# Patient Record
Sex: MALE | Race: WHITE | NOT HISPANIC OR LATINO | Employment: OTHER | URBAN - NONMETROPOLITAN AREA
[De-identification: names, ages, dates, MRNs, and addresses within clinical notes are randomized per-mention and may not be internally consistent; named-entity substitution may affect disease eponyms.]

---

## 2024-10-04 ENCOUNTER — OFFICE VISIT (OUTPATIENT)
Dept: URGENT CARE | Facility: CLINIC | Age: 39
End: 2024-10-04
Payer: COMMERCIAL

## 2024-10-04 VITALS
TEMPERATURE: 99.3 F | DIASTOLIC BLOOD PRESSURE: 85 MMHG | SYSTOLIC BLOOD PRESSURE: 124 MMHG | HEART RATE: 99 BPM | WEIGHT: 180 LBS | OXYGEN SATURATION: 96 % | BODY MASS INDEX: 25.77 KG/M2 | RESPIRATION RATE: 16 BRPM | HEIGHT: 70 IN

## 2024-10-04 DIAGNOSIS — H92.03 OTALGIA OF BOTH EARS: ICD-10-CM

## 2024-10-04 DIAGNOSIS — J02.9 ACUTE PHARYNGITIS, UNSPECIFIED ETIOLOGY: Primary | ICD-10-CM

## 2024-10-04 PROCEDURE — 99213 OFFICE O/P EST LOW 20 MIN: CPT | Performed by: NURSE PRACTITIONER

## 2024-10-04 RX ORDER — AZITHROMYCIN 250 MG/1
TABLET, FILM COATED ORAL
Qty: 6 TABLET | Refills: 0 | Status: SHIPPED | OUTPATIENT
Start: 2024-10-04

## 2024-10-04 NOTE — PROGRESS NOTES
[facility] URGENT CARE  Nataliya DASIA Sargent, APRN-CNP     Visit Note - 10/4/2024 12:33 PM   This note was generated with voice recognition software and may contain errors including spelling, grammar, syntax, and misrecognization of what was dictated.    Patient: Adam Watson, MRN: [id number], 38 y.o., male   PCP: No primary care provider on file.  ------------------------------------  ALLERGIES: No Known Allergies     CURRENT MEDICATIONS:   Current Outpatient Medications   Medication Instructions    azithromycin (Zithromax Z-Korey) 250 mg tablet Take 2 tablets by mouth at once on day 1, then 1 tablet once a day on days 2-5. Take with a meal.     ------------------------------------  PAST MEDICAL HX:  No known health issues.    SURGICAL HX:  History reviewed. No pertinent surgical history.   FAMILY HX:   No pertinent history.   SOCIAL HX:    reports that he has never smoked. He has never used smokeless tobacco. Is a .   ------------------------------------  CHIEF COMPLAINT:   Chief Complaint   Patient presents with    URI     Sore throat, bi-lat ear pain x 5 days      HISTORY OF PRESENT ILLNESS: The history was obtained from patient. Adam is a 38 y.o. male, who presents with a chief complaint of a sore throat, bilat ear pain/pressure, nasal congestion, and a mild, dry cough - sxs started on Monday. Reports also had subjective fevers/chills and body aches at onset, but these have improved. Denies any headaches, abdominal pain, chest pain, wheezing/shortness of breath, rashes, urinary symptoms, nausea/vomiting, and diarrhea. Denies any lightheadedness or dizziness; no changes in mental status. No swelling in legs. Appetite is decreased ; is able to eat and drink fluids without difficulty; denies loss of sense of taste or smell. Reports symptoms have gotten worse since onset. Has been taking  Tylenol and OTC cold medications  without much relief; no other over-the-counter medications or home remedies  "for symptom management. No known ill contacts. Has received the COVID vaccine x 2; Has not received this season's influenza vaccine. No known history of COVID infection.  Is not a smoker.  No known history of asthma/COPD/respiratory issues. No recent antibiotic use.     REVIEW OF SYSTEMS:  10 systems reviewed negative with exception of history of present illness as listed above.    TODAY'S VITALS: /85   Pulse 99   Temp 37.4 °C (99.3 °F)   Resp 16   Ht 1.778 m (5' 10\")   Wt 81.6 kg (180 lb)   SpO2 96%   BMI 25.83 kg/m²     PHYSICAL EXAMINATION:  General:  Mildly ill-appearing, well nourished male; alert and oriented; in no acute distress. Sitting comfortably on exam table. Non-dyspneic.   Eyes:  Pupils equal, round and reactive to light. No conjunctival erythema; no scleral icterus.   HENT: No frontal or maxillary sinus tenderness; + audible nasal congestion. Airway patent, TMs both occluded by cerumen impactions; visible portions of ear canals unremarkable bilaterally. Nasal mucosa mildly injected and edematous. Oral mucosa moist. Posterior pharynx moderately injected. Uvula is midline. Managing oral secretions without difficulty.  Neck:  Supple. Tender, mobile anterior cervical lymphadenopathy bilat. Trachea is midline.  Respiratory:  Respirations easy and unlabored, Breath sounds equal. Lungs are clear to auscultation; no wheezes, rhonchi, or rales; has good air movement throughout. + mild, non-productive cough noted. Non-dyspneic with ambulation; able to maintain SpO2.  Cardiovascular:  Normal rate, Regular rhythm. Normal S1S2. No m/r/g. No peripheral edema.   Gastrointestinal:  Soft, non-tender, non-distended; no palpable masses or organomegaly. Bowel sounds normoactive.  Musculoskeletal:  Grossly normal; appropriate for age.     Integumentary:  Pink, warm, dry, and intact. No rashes or skin discoloration appreciated. Good skin turgor.  Neurologic:  Alert and oriented, no gross deficits.  "   Cognition and Speech:  Oriented, Speech clear and coherent.    Psychiatric:  Cooperative, Appropriate mood & affect.       ------------------------------------  Medical Decision Making  LABORATORY or RADIOLOGICAL IMAGING ORDERS/RESULTS:   None    IMPRESSION/PLAN:  Course: Worsening; stable    1. Acute pharyngitis, unspecified etiology  2. Otalgia of both ears  - azithromycin (Zithromax Z-Korey) 250 mg tablet; Take 2 tablets by mouth at once on day 1, then 1 tablet once a day on days 2-5. Take with a meal.  Dispense: 6 tablet; Refill: 0    No red flags on exam or per history. Unable to visualize TM's due to cerumen impactions. Based on history/exam, to err on the side of caution, due to severity and duration of symptoms, starting antibiotic treatment for presumptive strep pharyngitis/possible AOM of both ears today. Urged to complete full course of medication (Zithromax), even if symptoms resolve more quickly. Instructed to push fluids, rest, and to use appropriate over the counter medications as needed for management of symptoms - tylenol/ibuprofen, Cepacol, and salt water gargles may be helpful. Reviewed instructions for self-isolation and continued monitoring. Reviewed red flags to monitor for, counseled on potential adverse reactions of treatments, expectations for improvement in sxs, and advised to follow-up with primary care provider in 3-5 days if symptoms persist, or to seek care sooner if worsening or if any additional concerns/red flags develop.  Patient agreed with plan of care; questions were encouraged and answered.     I have reviewed the  COVID-19 algorithm, and discussed  COVID-19 current recommendations - patient states would like to defer COVID-19 testing today, but encouraged precautionary measures and close monitoring.        KYMBERLY Chin-CNP   Advanced Practice Provider  Merged with [facility] URGENT [facility]ARE

## 2025-01-02 ENCOUNTER — OFFICE VISIT (OUTPATIENT)
Dept: URGENT CARE | Facility: CLINIC | Age: 40
End: 2025-01-02
Payer: COMMERCIAL

## 2025-01-02 VITALS
BODY MASS INDEX: 25.77 KG/M2 | TEMPERATURE: 98.4 F | RESPIRATION RATE: 18 BRPM | WEIGHT: 180 LBS | OXYGEN SATURATION: 98 % | DIASTOLIC BLOOD PRESSURE: 82 MMHG | HEART RATE: 112 BPM | SYSTOLIC BLOOD PRESSURE: 150 MMHG | HEIGHT: 70 IN

## 2025-01-02 DIAGNOSIS — R30.0 DYSURIA: ICD-10-CM

## 2025-01-02 DIAGNOSIS — R35.0 URINARY FREQUENCY: Primary | ICD-10-CM

## 2025-01-02 LAB
GLUCOSE BLD MANUAL STRIP-MCNC: 104 MG/DL (ref 74–99)
POC APPEARANCE, URINE: CLEAR
POC BILIRUBIN, URINE: ABNORMAL
POC BLOOD, URINE: ABNORMAL
POC COLOR, URINE: ABNORMAL
POC GLUCOSE, URINE: ABNORMAL MG/DL
POC KETONES, URINE: ABNORMAL MG/DL
POC LEUKOCYTES, URINE: ABNORMAL
POC NITRITE,URINE: POSITIVE
POC PH, URINE: 5 PH
POC PROTEIN, URINE: ABNORMAL MG/DL
POC SPECIFIC GRAVITY, URINE: 1.02
POC UROBILINOGEN, URINE: 2 EU/DL

## 2025-01-02 PROCEDURE — 99213 OFFICE O/P EST LOW 20 MIN: CPT | Performed by: NURSE PRACTITIONER

## 2025-01-02 PROCEDURE — 81002 URINALYSIS NONAUTO W/O SCOPE: CPT | Performed by: NURSE PRACTITIONER

## 2025-01-02 RX ORDER — CIPROFLOXACIN 500 MG/1
500 TABLET ORAL 2 TIMES DAILY
Qty: 14 TABLET | Refills: 0 | Status: SHIPPED | OUTPATIENT
Start: 2025-01-02 | End: 2025-01-09

## 2025-01-02 NOTE — PROGRESS NOTES
39 y.o. male presents for evaluation of evaluation of cough and chest congestion for the past 3 days.  Did have sore throat on first day of symptoms but that resolved after 24 hours.  Denies any associated fever, body aches, fatigue, chest pain, shortness of breath, nausea, vomiting, diarrhea, fatigue or any other associated symptom or complaint.  Patient also states he has had urinary frequency, urgency for the past 6 weeks.  States he did have 1 episode of dysuria 2 weeks ago.  Pain lasted for 1 day and 1 episode urination.  Denies any associated fever, flank pains, abdominal pains, penile discharge, scrotal pains, scrotal swelling, nausea or vomiting or any other associated symptom complaint.  Has taking Azo OTC for urinary symptoms.  Has not taken any OTC cold remedies for cold symptoms.    Vitals:    01/02/25 1116   BP: 150/82   Pulse: (!) 112   Resp: 18   Temp: 36.9 °C (98.4 °F)   SpO2: 98%       No Known Allergies    Medication Documentation Review Audit       Reviewed by Mirna Torres MA (Medical Assistant) on 01/02/25 at 1114      Medication Order Taking? Sig Documenting Provider Last Dose Status   azithromycin (Zithromax Z-Korey) 250 mg tablet [id number]  Take 2 tablets by mouth at once on day 1, then 1 tablet once a day on days 2-5. Take with a meal.   Patient not taking: Reported on 1/2/2025    KYMBERLY Chin-CNP  Active                    No past medical history on file.    No past surgical history on file.    ROS  See HPI    Physical Exam  Vitals and nursing note reviewed.   Constitutional:       General: He is not in acute distress.     Appearance: Normal appearance. He is not ill-appearing or toxic-appearing.   HENT:      Head: Normocephalic and atraumatic.      Right Ear: Tympanic membrane, ear canal and external ear normal.      Left Ear: Tympanic membrane, ear canal and external ear normal.      Nose: Nose normal.      Mouth/Throat:      Mouth: Mucous membranes are moist.      Pharynx:  Oropharynx is clear.   Eyes:      Conjunctiva/sclera: Conjunctivae normal.      Pupils: Pupils are equal, round, and reactive to light.   Cardiovascular:      Rate and Rhythm: Normal rate.   Pulmonary:      Effort: Pulmonary effort is normal.      Breath sounds: Normal breath sounds.   Abdominal:      General: Abdomen is flat. Bowel sounds are normal. There is no distension.      Palpations: Abdomen is soft.      Tenderness: There is no abdominal tenderness. There is no right CVA tenderness, left CVA tenderness, guarding or rebound.   Genitourinary:     Penis: Normal.       Testes: Normal.      Comments: Per patient report, defer exam today.  Skin:     General: Skin is warm.   Neurological:      General: No focal deficit present.      Mental Status: He is alert and oriented to person, place, and time.   Psychiatric:         Mood and Affect: Mood normal.         Behavior: Behavior normal.       Recent Results (from the past hour)   POCT UA (nonautomated w/o microscopy) manually resulted    Collection Time: 01/02/25 11:23 AM   Result Value Ref Range    POC Color, Urine Orange (A) Straw, Yellow, Light-Yellow    POC Appearance, Urine Clear Clear    POC Glucose, Urine 250 (2+) (A) NEGATIVE mg/dl    POC Bilirubin, Urine SMALL (1+) (A) NEGATIVE    POC Ketones, Urine TRACE (A) NEGATIVE mg/dl    POC Specific Gravity, Urine 1.025 1.005 - 1.035    POC Blood, Urine SMALL (1+) (A) NEGATIVE    POC PH, Urine 5.0 No Reference Range Established PH    POC Protein, Urine 100 (2+) (A) NEGATIVE mg/dl    POC Urobilinogen, Urine 2.0 (A) 0.2, 1.0 EU/DL    Poc Nitrite, Urine POSITIVE (A) NEGATIVE    POC Leukocytes, Urine LARGE (3+) (A) NEGATIVE   POCT GLUCOSE    Collection Time: 01/02/25 11:39 AM   Result Value Ref Range    POCT Glucose 104 (H) 74 - 99 mg/dL       Assessment/Plan/MDM  Adam was seen today for uri.  Diagnoses and all orders for this visit:  Urinary frequency (Primary)  -     Cancel: POCT glucose manually resulted  -      Urine Culture  -     ciprofloxacin (Cipro) 500 mg tablet; Take 1 tablet (500 mg) by mouth 2 times a day for 7 days.  Dysuria  -     POCT UA (nonautomated w/o microscopy) manually resulted  Other orders  -     POCT GLUCOSE    Patient declines any viral testing or treatment for upper respiratory symptoms today.  His exam is essentially unremarkable today.  He is concerned that he has a UTI as he has had urinary frequency and 1 episode of dysuria over the course of the past 6 weeks.  He denies any concerns for STDs and declines testing today.  I offered a lab workup today and he declines that as well.  He was agreeable to point-of-care glucose fingerstick which was 104.  He states he ate a bagel a few hours ago and had water as well.  We will begin treatment with Cipro today for possible UTI.  He did take Azo so his point-of-care UA was skewed significantly.  Will relay culture results when able.  Discussed worsening signs and symptoms and when to seek to higher level of care or return to urgent care or follow-up with PCP.  Patient's clinical presentation is otherwise unremarkable at this time. Patient is discharged with instructions to follow-up with primary care or seek emergency medical attention for worsening symptoms or any new concerns.    I did personally review Adam's past medical history, surgical history, social history, as well as family history (when relevant).  In this case, I also oversaw the his drug management by reviewing his medication list, allergy list, as well as the medications that I prescribed during the UC course and/or recommended as an out-patient (including possible OTC medications such as acetaminophen, NSAIDs , etc).    After reviewing the items above, I did look at previous medical documentation, such as recent hospitalizations, office visits, and/or recent consultations with PCP/specialist.                          SDOH:   Another factor that I considered in Adam's care was his  Social Determinants of Health (SDOH). During this UC encounter, he did not have social determinants of health. Those SDOH influencing Adam's care are: none      August Jain CNP  [facility]  [phone]

## 2025-01-04 LAB — BACTERIA UR CULT: NO GROWTH

## 2025-01-04 NOTE — RESULT ENCOUNTER NOTE
Attempted to contact patient to review urine culture results (no growth) - no answer - l/m for him to call the office.

## 2025-01-06 ENCOUNTER — TELEPHONE (OUTPATIENT)
Dept: URGENT CARE | Facility: CLINIC | Age: 40
End: 2025-01-06
Payer: COMMERCIAL

## 2025-02-08 ENCOUNTER — HOSPITAL ENCOUNTER (EMERGENCY)
Facility: HOSPITAL | Age: 40
Discharge: HOME | End: 2025-02-08
Attending: EMERGENCY MEDICINE
Payer: COMMERCIAL

## 2025-02-08 VITALS
WEIGHT: 180 LBS | TEMPERATURE: 97.7 F | RESPIRATION RATE: 16 BRPM | HEART RATE: 98 BPM | BODY MASS INDEX: 25.77 KG/M2 | DIASTOLIC BLOOD PRESSURE: 95 MMHG | OXYGEN SATURATION: 97 % | HEIGHT: 70 IN | SYSTOLIC BLOOD PRESSURE: 170 MMHG

## 2025-02-08 DIAGNOSIS — N39.0 UTI (URINARY TRACT INFECTION), BACTERIAL: Primary | ICD-10-CM

## 2025-02-08 DIAGNOSIS — A49.9 UTI (URINARY TRACT INFECTION), BACTERIAL: Primary | ICD-10-CM

## 2025-02-08 LAB
APPEARANCE UR: CLEAR
BILIRUB UR STRIP.AUTO-MCNC: NEGATIVE MG/DL
COLOR UR: YELLOW
GLUCOSE UR STRIP.AUTO-MCNC: NORMAL MG/DL
KETONES UR STRIP.AUTO-MCNC: ABNORMAL MG/DL
LEUKOCYTE ESTERASE UR QL STRIP.AUTO: NEGATIVE
MUCOUS THREADS #/AREA URNS AUTO: NORMAL /LPF
NITRITE UR QL STRIP.AUTO: NEGATIVE
PH UR STRIP.AUTO: 5.5 [PH]
PROT UR STRIP.AUTO-MCNC: ABNORMAL MG/DL
RBC # UR STRIP.AUTO: ABNORMAL MG/DL
RBC #/AREA URNS AUTO: NORMAL /HPF
SP GR UR STRIP.AUTO: 1.03
UROBILINOGEN UR STRIP.AUTO-MCNC: NORMAL MG/DL
WBC #/AREA URNS AUTO: NORMAL /HPF

## 2025-02-08 PROCEDURE — 81001 URINALYSIS AUTO W/SCOPE: CPT | Performed by: EMERGENCY MEDICINE

## 2025-02-08 PROCEDURE — 99283 EMERGENCY DEPT VISIT LOW MDM: CPT | Performed by: EMERGENCY MEDICINE

## 2025-02-08 PROCEDURE — 2500000001 HC RX 250 WO HCPCS SELF ADMINISTERED DRUGS (ALT 637 FOR MEDICARE OP): Performed by: EMERGENCY MEDICINE

## 2025-02-08 RX ORDER — DOXYCYCLINE HYCLATE 100 MG
100 TABLET ORAL ONCE
Status: COMPLETED | OUTPATIENT
Start: 2025-02-08 | End: 2025-02-08

## 2025-02-08 RX ORDER — DOXYCYCLINE 100 MG/1
100 CAPSULE ORAL 2 TIMES DAILY
Qty: 20 CAPSULE | Refills: 0 | Status: SHIPPED | OUTPATIENT
Start: 2025-02-08 | End: 2025-02-18

## 2025-02-08 RX ADMIN — DOXYCYCLINE HYCLATE 100 MG: 100 TABLET, COATED ORAL at 21:14

## 2025-02-08 ASSESSMENT — PAIN - FUNCTIONAL ASSESSMENT: PAIN_FUNCTIONAL_ASSESSMENT: 0-10

## 2025-02-08 ASSESSMENT — PAIN SCALES - GENERAL: PAINLEVEL_OUTOF10: 2

## 2025-02-08 ASSESSMENT — PAIN DESCRIPTION - PAIN TYPE: TYPE: ACUTE PAIN

## 2025-02-08 ASSESSMENT — COLUMBIA-SUICIDE SEVERITY RATING SCALE - C-SSRS
6. HAVE YOU EVER DONE ANYTHING, STARTED TO DO ANYTHING, OR PREPARED TO DO ANYTHING TO END YOUR LIFE?: NO
1. IN THE PAST MONTH, HAVE YOU WISHED YOU WERE DEAD OR WISHED YOU COULD GO TO SLEEP AND NOT WAKE UP?: NO
2. HAVE YOU ACTUALLY HAD ANY THOUGHTS OF KILLING YOURSELF?: NO

## 2025-02-08 ASSESSMENT — PAIN DESCRIPTION - LOCATION: LOCATION: ABDOMEN

## 2025-02-08 ASSESSMENT — PAIN DESCRIPTION - ORIENTATION: ORIENTATION: RIGHT;POSTERIOR

## 2025-02-09 NOTE — ED PROVIDER NOTES
HPI   Chief Complaint   Patient presents with    Urinary Frequency     Pt. Reports being treated in ED for UTI earlier this year. Yesterday developed urinary, burning, frequency and chills. Endorses minimal R flank pain when driving.       39-year-old male presents with polyuria.  Patient states he had a urinary tract at the beginning of the year which did not grow anything so he was not really treated patient states it spontaneously resolved.  Patient denies any new sexual partners.  Patient denies any nausea or vomiting but may have had some chills.  No hematuria.  The patient feels that since he got COVID in October he said some intermittent issues.  I did go over the results of the urine.  Patient will be given a dose of doxycycline and instructed to increase fluids.      History provided by:  Patient          Patient History   No past medical history on file.  No past surgical history on file.  No family history on file.  Social History     Tobacco Use    Smoking status: Never    Smokeless tobacco: Never   Substance Use Topics    Alcohol use: Not on file    Drug use: Not on file       Physical Exam   ED Triage Vitals [02/08/25 2027]   Temperature Heart Rate Respirations BP   36.5 °C (97.7 °F) 98 16 (!) 170/95      Pulse Ox Temp Source Heart Rate Source Patient Position   97 % Oral Monitor --      BP Location FiO2 (%)     -- --       Physical Exam  Vitals and nursing note reviewed.   Constitutional:       General: He is not in acute distress.     Appearance: He is well-developed.   HENT:      Head: Normocephalic and atraumatic.   Eyes:      Conjunctiva/sclera: Conjunctivae normal.   Cardiovascular:      Rate and Rhythm: Normal rate and regular rhythm.      Heart sounds: No murmur heard.  Pulmonary:      Effort: Pulmonary effort is normal. No respiratory distress.      Breath sounds: Normal breath sounds.   Abdominal:      Palpations: Abdomen is soft.      Tenderness: There is no abdominal tenderness.    Musculoskeletal:         General: No swelling.      Cervical back: Neck supple.   Skin:     General: Skin is warm and dry.      Capillary Refill: Capillary refill takes less than 2 seconds.   Neurological:      Mental Status: He is alert.   Psychiatric:         Mood and Affect: Mood normal.         Labs Reviewed   URINALYSIS WITH REFLEX MICROSCOPIC - Abnormal       Result Value    Color, Urine Yellow      Appearance, Urine Clear      Specific Gravity, Urine 1.034      pH, Urine 5.5      Protein, Urine 20 (TRACE)      Glucose, Urine Normal      Blood, Urine 0.03 (TRACE) (*)     Ketones, Urine TRACE (*)     Bilirubin, Urine NEGATIVE      Urobilinogen, Urine Normal      Nitrite, Urine NEGATIVE      Leukocyte Esterase, Urine NEGATIVE     MICROSCOPIC ONLY, URINE    WBC, Urine 1-5      RBC, Urine 1-2      Mucus, Urine 1+        ED Course & MDM   Diagnoses as of 02/08/25 2105   UTI (urinary tract infection), bacterial                 No data recorded     Alen Coma Scale Score: 15 (02/08/25 2042 : Mlyene Go RN)                           Medical Decision Making  1 increase fluids 2 take medication as prescribed 3 follow-up with family medical doctor in 2 to 3 days if no improvement return to ED.        Procedure  Procedures     Manpreet Marin DO  02/08/25 2106

## 2025-02-22 ENCOUNTER — APPOINTMENT (OUTPATIENT)
Dept: RADIOLOGY | Facility: HOSPITAL | Age: 40
End: 2025-02-22
Payer: COMMERCIAL

## 2025-02-22 ENCOUNTER — HOSPITAL ENCOUNTER (EMERGENCY)
Facility: HOSPITAL | Age: 40
Discharge: HOME | End: 2025-02-22
Payer: COMMERCIAL

## 2025-02-22 VITALS
OXYGEN SATURATION: 96 % | SYSTOLIC BLOOD PRESSURE: 141 MMHG | WEIGHT: 180 LBS | DIASTOLIC BLOOD PRESSURE: 87 MMHG | TEMPERATURE: 98.4 F | HEIGHT: 70 IN | BODY MASS INDEX: 25.77 KG/M2 | HEART RATE: 84 BPM | RESPIRATION RATE: 16 BRPM

## 2025-02-22 DIAGNOSIS — R39.15 URGENCY OF URINATION: Primary | ICD-10-CM

## 2025-02-22 LAB
ALBUMIN SERPL BCP-MCNC: 4.8 G/DL (ref 3.4–5)
ALP SERPL-CCNC: 57 U/L (ref 33–120)
ALT SERPL W P-5'-P-CCNC: 24 U/L (ref 10–52)
ANION GAP SERPL CALC-SCNC: 10 MMOL/L (ref 10–20)
APPEARANCE UR: CLEAR
AST SERPL W P-5'-P-CCNC: 19 U/L (ref 9–39)
BILIRUB SERPL-MCNC: 0.6 MG/DL (ref 0–1.2)
BILIRUB UR STRIP.AUTO-MCNC: NEGATIVE MG/DL
BUN SERPL-MCNC: 12 MG/DL (ref 6–23)
C TRACH RRNA SPEC QL NAA+PROBE: NOT DETECTED
CALCIUM SERPL-MCNC: 9.4 MG/DL (ref 8.6–10.3)
CHLORIDE SERPL-SCNC: 106 MMOL/L (ref 98–107)
CO2 SERPL-SCNC: 27 MMOL/L (ref 21–32)
COLOR UR: ABNORMAL
CREAT SERPL-MCNC: 0.75 MG/DL (ref 0.5–1.3)
EGFRCR SERPLBLD CKD-EPI 2021: >90 ML/MIN/1.73M*2
ERYTHROCYTE [DISTWIDTH] IN BLOOD BY AUTOMATED COUNT: 12 % (ref 11.5–14.5)
GLUCOSE SERPL-MCNC: 107 MG/DL (ref 74–99)
GLUCOSE UR STRIP.AUTO-MCNC: NORMAL MG/DL
HCT VFR BLD AUTO: 45.7 % (ref 41–52)
HGB BLD-MCNC: 15.6 G/DL (ref 13.5–17.5)
HOLD SPECIMEN: NORMAL
KETONES UR STRIP.AUTO-MCNC: ABNORMAL MG/DL
LEUKOCYTE ESTERASE UR QL STRIP.AUTO: NEGATIVE
MCH RBC QN AUTO: 26.9 PG (ref 26–34)
MCHC RBC AUTO-ENTMCNC: 34.1 G/DL (ref 32–36)
MCV RBC AUTO: 79 FL (ref 80–100)
MUCOUS THREADS #/AREA URNS AUTO: NORMAL /LPF
N GONORRHOEA DNA SPEC QL PROBE+SIG AMP: NOT DETECTED
NITRITE UR QL STRIP.AUTO: NEGATIVE
NRBC BLD-RTO: 0 /100 WBCS (ref 0–0)
PH UR STRIP.AUTO: 5.5 [PH]
PLATELET # BLD AUTO: 274 X10*3/UL (ref 150–450)
POTASSIUM SERPL-SCNC: 3.5 MMOL/L (ref 3.5–5.3)
PROT SERPL-MCNC: 6.9 G/DL (ref 6.4–8.2)
PROT UR STRIP.AUTO-MCNC: ABNORMAL MG/DL
RBC # BLD AUTO: 5.81 X10*6/UL (ref 4.5–5.9)
RBC # UR STRIP.AUTO: ABNORMAL MG/DL
RBC #/AREA URNS AUTO: NORMAL /HPF
SODIUM SERPL-SCNC: 139 MMOL/L (ref 136–145)
SP GR UR STRIP.AUTO: 1.03
UROBILINOGEN UR STRIP.AUTO-MCNC: NORMAL MG/DL
WBC # BLD AUTO: 7 X10*3/UL (ref 4.4–11.3)
WBC #/AREA URNS AUTO: NORMAL /HPF

## 2025-02-22 PROCEDURE — 99285 EMERGENCY DEPT VISIT HI MDM: CPT | Mod: 25

## 2025-02-22 PROCEDURE — 80053 COMPREHEN METABOLIC PANEL: CPT | Performed by: PHYSICIAN ASSISTANT

## 2025-02-22 PROCEDURE — 36415 COLL VENOUS BLD VENIPUNCTURE: CPT | Performed by: PHYSICIAN ASSISTANT

## 2025-02-22 PROCEDURE — 81001 URINALYSIS AUTO W/SCOPE: CPT | Performed by: EMERGENCY MEDICINE

## 2025-02-22 PROCEDURE — 85027 COMPLETE CBC AUTOMATED: CPT | Performed by: PHYSICIAN ASSISTANT

## 2025-02-22 PROCEDURE — 74177 CT ABD & PELVIS W/CONTRAST: CPT | Mod: FOREIGN READ | Performed by: RADIOLOGY

## 2025-02-22 PROCEDURE — 74177 CT ABD & PELVIS W/CONTRAST: CPT

## 2025-02-22 PROCEDURE — 87591 N.GONORRHOEAE DNA AMP PROB: CPT | Performed by: EMERGENCY MEDICINE

## 2025-02-22 PROCEDURE — 2550000001 HC RX 255 CONTRASTS: Performed by: PHYSICIAN ASSISTANT

## 2025-02-22 PROCEDURE — 87661 TRICHOMONAS VAGINALIS AMPLIF: CPT | Mod: SAMLAB | Performed by: EMERGENCY MEDICINE

## 2025-02-22 RX ORDER — PHENAZOPYRIDINE HYDROCHLORIDE 200 MG/1
200 TABLET, FILM COATED ORAL 3 TIMES DAILY
Qty: 6 TABLET | Refills: 0 | Status: SHIPPED | OUTPATIENT
Start: 2025-02-22 | End: 2025-02-24

## 2025-02-22 RX ADMIN — IOHEXOL 68 ML: 350 INJECTION, SOLUTION INTRAVENOUS at 12:41

## 2025-02-22 ASSESSMENT — PAIN - FUNCTIONAL ASSESSMENT: PAIN_FUNCTIONAL_ASSESSMENT: 0-10

## 2025-02-22 ASSESSMENT — PAIN SCALES - GENERAL: PAINLEVEL_OUTOF10: 2

## 2025-02-22 NOTE — DISCHARGE INSTRUCTIONS
Instructions for up-to-date on interstitial cystitis are given.  This is to add to your understanding.  Referral has been placed to urology for follow-up care.  If you do not receive a call from any urology within the next few days.  Please contact one of the urologist on your discharge papers.

## 2025-02-22 NOTE — ED PROVIDER NOTES
HPI   Chief Complaint   Patient presents with    Difficulty Urinating     Recently sx with UTI and finished rx for Doxycyline. Return of sx Sunday, worse today. C/o urinary frequency, dysuria, fever and chillls       Continues with frequency and urgency.  States he has bladder discomfort.  States he was seen at an urgent care placed on an antibiotic got some improvement and then symptoms returned.  Came to the emergency department got put on some more antibiotics.  States he was feeling better and then symptoms returned.  Denies any penile discharge or lesions.  Denies any fever or chills.  He has not noticed any hematuria.  No concern for any STDs.  Denies any trouble having a bowel movement.  Appetite remains intact.  No abdominal or back pain.      History provided by:  Patient          Patient History   History reviewed. No pertinent past medical history.  History reviewed. No pertinent surgical history.  No family history on file.  Social History     Tobacco Use    Smoking status: Never    Smokeless tobacco: Never   Substance Use Topics    Alcohol use: Not on file    Drug use: Not on file       Physical Exam   ED Triage Vitals [02/22/25 1010]   Temperature Heart Rate Respirations BP   36.9 °C (98.4 °F) 90 16 138/86      Pulse Ox Temp Source Heart Rate Source Patient Position   95 % Temporal -- --      BP Location FiO2 (%)     -- --       Physical Exam  Vitals and nursing note reviewed.   Constitutional:       General: He is not in acute distress.     Appearance: Normal appearance. He is normal weight. He is not ill-appearing, toxic-appearing or diaphoretic.   HENT:      Head: Normocephalic.      Right Ear: External ear normal.      Left Ear: External ear normal.      Nose: Nose normal.      Mouth/Throat:      Lips: No lesions.      Mouth: Mucous membranes are moist.   Eyes:      General: No scleral icterus.     Conjunctiva/sclera: Conjunctivae normal.   Cardiovascular:      Rate and Rhythm: Normal rate and  regular rhythm.      Heart sounds: Normal heart sounds.   Pulmonary:      Effort: Pulmonary effort is normal.      Breath sounds: Normal breath sounds and air entry.   Abdominal:      General: Bowel sounds are normal. There is no distension.      Palpations: Abdomen is soft.      Tenderness: There is no abdominal tenderness. There is no right CVA tenderness, left CVA tenderness or guarding.   Genitourinary:     Comments: Patient declined  Skin:     General: Skin is warm.      Capillary Refill: Capillary refill takes less than 2 seconds.   Neurological:      General: No focal deficit present.      Mental Status: He is alert and oriented to person, place, and time.      Cranial Nerves: No cranial nerve deficit.      Sensory: No sensory deficit.      Motor: No weakness.      Gait: Gait normal.   Psychiatric:         Attention and Perception: Attention and perception normal.         Mood and Affect: Mood and affect normal.         Speech: Speech normal.         Behavior: Behavior normal. Behavior is cooperative.         Thought Content: Thought content normal.         Cognition and Memory: Cognition and memory normal.         Judgment: Judgment normal.           ED Course & MDM   Diagnoses as of 02/22/25 1345   Urgency of urination                 No data recorded                                 Medical Decision Making  Continues with frequency and urgency.  States he has bladder discomfort.  States he was seen at an urgent care placed on an antibiotic got some improvement and then symptoms returned.  Came to the emergency department got put on some more antibiotics.  States he was feeling better and then symptoms returned.  Denies any penile discharge or lesions.  Denies any fever or chills.  He has not noticed any hematuria.  No concern for any STDs.  Denies any trouble having a bowel movement.  Appetite remains intact.  No abdominal or back pain.    Ddx: Urethritis, UTI, prostatitis, cystitis, interstitial cystitis,  other    This patient has return to the emergency department with the same symptoms will add labs and CT to his urinalysis.  No acute findings are noted  Referral placed to urology.  Patient encouraged to follow-up with soon as possible.  He will be placed on Pyridium for short course to help with symptoms.  He is also given local urology for follow-up if he does not hear from anyone in the next few days.  He is encouraged to return with any worsening symptoms or concerns.  Patient discharged home in improved stable condition    Amount and/or Complexity of Data Reviewed  Labs: ordered. Decision-making details documented in ED Course.  Radiology: ordered and independent interpretation performed. Decision-making details documented in ED Course.    Risk  OTC drugs.  Diagnosis or treatment significantly limited by social determinants of health.        Procedure  Procedures     Smiley Foster PA-C  02/22/25 2727

## 2025-02-23 LAB — T VAGINALIS RRNA SPEC QL NAA+PROBE: NEGATIVE
